# Patient Record
Sex: FEMALE | Race: AMERICAN INDIAN OR ALASKA NATIVE | HISPANIC OR LATINO
[De-identification: names, ages, dates, MRNs, and addresses within clinical notes are randomized per-mention and may not be internally consistent; named-entity substitution may affect disease eponyms.]

---

## 2017-01-13 ENCOUNTER — APPOINTMENT (OUTPATIENT)
Dept: SURGERY | Facility: CLINIC | Age: 37
End: 2017-01-13

## 2017-01-13 ENCOUNTER — OUTPATIENT (OUTPATIENT)
Dept: OUTPATIENT SERVICES | Facility: HOSPITAL | Age: 37
LOS: 1 days | End: 2017-01-13

## 2017-01-13 DIAGNOSIS — Z01.818 ENCOUNTER FOR OTHER PREPROCEDURAL EXAMINATION: ICD-10-CM

## 2017-01-13 DIAGNOSIS — E66.9 OBESITY, UNSPECIFIED: ICD-10-CM

## 2017-01-13 LAB
ALBUMIN SERPL ELPH-MCNC: 3.5 G/DL — SIGNIFICANT CHANGE UP (ref 3.4–5)
ALP SERPL-CCNC: 53 U/L — SIGNIFICANT CHANGE UP (ref 40–120)
ALT FLD-CCNC: 28 U/L — SIGNIFICANT CHANGE UP (ref 12–42)
ANION GAP SERPL CALC-SCNC: 8 MMOL/L — LOW (ref 9–16)
AST SERPL-CCNC: 18 U/L — SIGNIFICANT CHANGE UP (ref 15–37)
BILIRUB SERPL-MCNC: 0.7 MG/DL — SIGNIFICANT CHANGE UP (ref 0.2–1.2)
BUN SERPL-MCNC: 13 MG/DL — SIGNIFICANT CHANGE UP (ref 7–23)
CALCIUM SERPL-MCNC: 8.8 MG/DL — SIGNIFICANT CHANGE UP (ref 8.5–10.5)
CHLORIDE SERPL-SCNC: 108 MMOL/L — SIGNIFICANT CHANGE UP (ref 96–108)
CO2 SERPL-SCNC: 25 MMOL/L — SIGNIFICANT CHANGE UP (ref 22–31)
CREAT SERPL-MCNC: 0.6 MG/DL — SIGNIFICANT CHANGE UP (ref 0.5–1.3)
GLUCOSE SERPL-MCNC: 75 MG/DL — SIGNIFICANT CHANGE UP (ref 70–99)
HCT VFR BLD CALC: 34.6 % — SIGNIFICANT CHANGE UP (ref 34.5–45)
HGB BLD-MCNC: 11.1 G/DL — LOW (ref 11.5–15.5)
MCHC RBC-ENTMCNC: 28.5 PG — SIGNIFICANT CHANGE UP (ref 27–34)
MCHC RBC-ENTMCNC: 32.1 G/DL — SIGNIFICANT CHANGE UP (ref 32–36)
MCV RBC AUTO: 88.7 FL — SIGNIFICANT CHANGE UP (ref 80–100)
PLATELET # BLD AUTO: 255 K/UL — SIGNIFICANT CHANGE UP (ref 150–400)
POTASSIUM SERPL-MCNC: 4.1 MMOL/L — SIGNIFICANT CHANGE UP (ref 3.5–5.3)
POTASSIUM SERPL-SCNC: 4.1 MMOL/L — SIGNIFICANT CHANGE UP (ref 3.5–5.3)
PROT SERPL-MCNC: 7.4 G/DL — SIGNIFICANT CHANGE UP (ref 6.4–8.2)
RBC # BLD: 3.9 M/UL — SIGNIFICANT CHANGE UP (ref 3.8–5.2)
RBC # FLD: 15.1 % — SIGNIFICANT CHANGE UP (ref 10.3–16.9)
SODIUM SERPL-SCNC: 141 MMOL/L — SIGNIFICANT CHANGE UP (ref 135–145)
WBC # BLD: 6.5 K/UL — SIGNIFICANT CHANGE UP (ref 3.8–10.5)
WBC # FLD AUTO: 6.5 K/UL — SIGNIFICANT CHANGE UP (ref 3.8–10.5)

## 2017-01-25 ENCOUNTER — RESULT REVIEW (OUTPATIENT)
Age: 37
End: 2017-01-25

## 2017-01-26 ENCOUNTER — OUTPATIENT (OUTPATIENT)
Dept: OUTPATIENT SERVICES | Facility: HOSPITAL | Age: 37
LOS: 1 days | Discharge: HOME | End: 2017-01-26

## 2017-02-01 DIAGNOSIS — Z82.49 FAMILY HISTORY OF ISCHEMIC HEART DISEASE AND OTHER DISEASES OF THE CIRCULATORY SYSTEM: ICD-10-CM

## 2017-02-01 DIAGNOSIS — Z91.013 ALLERGY TO SEAFOOD: ICD-10-CM

## 2017-02-01 DIAGNOSIS — G43.909 MIGRAINE, UNSPECIFIED, NOT INTRACTABLE, WITHOUT STATUS MIGRAINOSUS: ICD-10-CM

## 2017-02-01 DIAGNOSIS — E66.9 OBESITY, UNSPECIFIED: ICD-10-CM

## 2017-02-01 DIAGNOSIS — Z83.3 FAMILY HISTORY OF DIABETES MELLITUS: ICD-10-CM

## 2017-02-01 DIAGNOSIS — E65 LOCALIZED ADIPOSITY: ICD-10-CM

## 2017-02-01 DIAGNOSIS — R63.4 ABNORMAL WEIGHT LOSS: ICD-10-CM

## 2017-02-01 DIAGNOSIS — Z98.84 BARIATRIC SURGERY STATUS: ICD-10-CM

## 2017-02-06 LAB — SURGICAL PATHOLOGY STUDY: SIGNIFICANT CHANGE UP

## 2019-01-04 ENCOUNTER — APPOINTMENT (OUTPATIENT)
Dept: SURGERY | Facility: CLINIC | Age: 39
End: 2019-01-04
Payer: MEDICAID

## 2019-01-04 ENCOUNTER — OTHER (OUTPATIENT)
Age: 39
End: 2019-01-04

## 2019-01-04 VITALS
OXYGEN SATURATION: 99 % | HEIGHT: 65.75 IN | HEART RATE: 97 BPM | TEMPERATURE: 97.9 F | DIASTOLIC BLOOD PRESSURE: 85 MMHG | WEIGHT: 210.38 LBS | BODY MASS INDEX: 34.22 KG/M2 | SYSTOLIC BLOOD PRESSURE: 129 MMHG

## 2019-01-04 DIAGNOSIS — G43.909 MIGRAINE, UNSPECIFIED, NOT INTRACTABLE, W/OUT STATUS MIGRAINOSUS: ICD-10-CM

## 2019-01-04 PROCEDURE — 99204 OFFICE O/P NEW MOD 45 MIN: CPT

## 2019-01-07 ENCOUNTER — OUTPATIENT (OUTPATIENT)
Dept: OUTPATIENT SERVICES | Facility: HOSPITAL | Age: 39
LOS: 1 days | End: 2019-01-07
Payer: COMMERCIAL

## 2019-01-07 ENCOUNTER — APPOINTMENT (OUTPATIENT)
Dept: SLEEP CENTER | Facility: HOSPITAL | Age: 39
End: 2019-01-07

## 2019-01-07 DIAGNOSIS — G47.33 OBSTRUCTIVE SLEEP APNEA (ADULT) (PEDIATRIC): ICD-10-CM

## 2019-01-07 PROCEDURE — 95810 POLYSOM 6/> YRS 4/> PARAM: CPT

## 2019-01-07 PROCEDURE — 95810 POLYSOM 6/> YRS 4/> PARAM: CPT | Mod: 26

## 2019-02-12 ENCOUNTER — APPOINTMENT (OUTPATIENT)
Dept: RADIOLOGY | Facility: HOSPITAL | Age: 39
End: 2019-02-12
Payer: MEDICAID

## 2019-02-12 ENCOUNTER — OUTPATIENT (OUTPATIENT)
Dept: OUTPATIENT SERVICES | Facility: HOSPITAL | Age: 39
LOS: 1 days | End: 2019-02-12
Payer: MEDICAID

## 2019-02-12 ENCOUNTER — APPOINTMENT (OUTPATIENT)
Dept: ULTRASOUND IMAGING | Facility: HOSPITAL | Age: 39
End: 2019-02-12
Payer: MEDICAID

## 2019-02-12 PROCEDURE — 74241: CPT | Mod: 26

## 2019-02-12 PROCEDURE — 76700 US EXAM ABDOM COMPLETE: CPT | Mod: 26

## 2019-02-12 PROCEDURE — 76700 US EXAM ABDOM COMPLETE: CPT

## 2019-02-12 PROCEDURE — 74241: CPT

## 2019-02-19 ENCOUNTER — APPOINTMENT (OUTPATIENT)
Age: 39
End: 2019-02-19

## 2019-02-19 VITALS — WEIGHT: 209 LBS | BODY MASS INDEX: 33.99 KG/M2 | HEIGHT: 65.75 IN

## 2019-03-06 ENCOUNTER — FORM ENCOUNTER (OUTPATIENT)
Age: 39
End: 2019-03-06

## 2019-03-07 ENCOUNTER — APPOINTMENT (OUTPATIENT)
Dept: PULMONOLOGY | Facility: CLINIC | Age: 39
End: 2019-03-07
Payer: MEDICAID

## 2019-03-07 ENCOUNTER — OUTPATIENT (OUTPATIENT)
Dept: OUTPATIENT SERVICES | Facility: HOSPITAL | Age: 39
LOS: 1 days | End: 2019-03-07
Payer: COMMERCIAL

## 2019-03-07 VITALS
HEART RATE: 89 BPM | WEIGHT: 211 LBS | BODY MASS INDEX: 34.32 KG/M2 | DIASTOLIC BLOOD PRESSURE: 80 MMHG | HEIGHT: 65.75 IN | TEMPERATURE: 98 F | SYSTOLIC BLOOD PRESSURE: 125 MMHG | OXYGEN SATURATION: 98 %

## 2019-03-07 DIAGNOSIS — Z01.811 ENCOUNTER FOR PREPROCEDURAL RESPIRATORY EXAMINATION: ICD-10-CM

## 2019-03-07 DIAGNOSIS — Z86.711 PERSONAL HISTORY OF PULMONARY EMBOLISM: ICD-10-CM

## 2019-03-07 DIAGNOSIS — Z82.49 FAMILY HISTORY OF ISCHEMIC HEART DISEASE AND OTHER DISEASES OF THE CIRCULATORY SYSTEM: ICD-10-CM

## 2019-03-07 PROCEDURE — 71046 X-RAY EXAM CHEST 2 VIEWS: CPT | Mod: 26

## 2019-03-07 PROCEDURE — 99204 OFFICE O/P NEW MOD 45 MIN: CPT

## 2019-03-07 PROCEDURE — 71046 X-RAY EXAM CHEST 2 VIEWS: CPT

## 2019-03-07 RX ORDER — ESOMEPRAZOLE MAGNESIUM 40 MG/1
40 CAPSULE, DELAYED RELEASE ORAL DAILY
Refills: 0 | Status: ACTIVE | COMMUNITY
Start: 2019-03-07

## 2019-03-07 NOTE — HISTORY OF PRESENT ILLNESS
[Difficulty Breathing During Exertion] : denies dyspnea on exertion [Feelings Of Weakness On Exertion] : denies exercise intolerance [Cough] : denies coughing [Wheezing] : denies wheezing [Regional Soft Tissue Swelling Both Lower Extremities] : denies lower extremity edema [Chest Pain Or Discomfort] : denies chest pain [Fever] : denies fever [0  -  Nothing at all] : 0, nothing at all [Class I - No Symptoms and No Limitations] : I [Never] : was never a smoker [None] : None [Adherent] : the patient is adherent with ~his/her~ medication regimen [Wt Gain ___ Lbs] : no recent weight gain [Wt Loss ___ Lbs] : no recent weight loss [Oxygen] : the patient uses no supplemental oxygen [Good Control] : peak flow has been poor [More Frequent Use Needed Recently] : Patient reports no recent increase in frequency of [Side Effects] : ~He/She~ denies medication side effects [FreeTextEntry1] : She okay. She is no shortness of breath. She had gastric bypass before but she is gaining weight. She had a sleep study.

## 2019-03-18 ENCOUNTER — APPOINTMENT (OUTPATIENT)
Age: 39
End: 2019-03-18
Payer: MEDICAID

## 2019-03-18 VITALS — BODY MASS INDEX: 34.81 KG/M2 | WEIGHT: 214 LBS | HEIGHT: 65.75 IN

## 2019-03-18 PROCEDURE — 97802 MEDICAL NUTRITION INDIV IN: CPT

## 2019-04-18 ENCOUNTER — LABORATORY RESULT (OUTPATIENT)
Age: 39
End: 2019-04-18

## 2019-04-18 VITALS — HEIGHT: 65.75 IN | BODY MASS INDEX: 34.81 KG/M2 | WEIGHT: 214 LBS

## 2019-05-13 ENCOUNTER — APPOINTMENT (OUTPATIENT)
Age: 39
End: 2019-05-13
Payer: MEDICAID

## 2019-05-13 VITALS — BODY MASS INDEX: 33.21 KG/M2 | HEIGHT: 67.5 IN | WEIGHT: 214.06 LBS

## 2019-05-13 PROCEDURE — 97803 MED NUTRITION INDIV SUBSEQ: CPT

## 2019-06-07 ENCOUNTER — APPOINTMENT (OUTPATIENT)
Dept: SURGERY | Facility: CLINIC | Age: 39
End: 2019-06-07
Payer: MEDICAID

## 2019-06-07 VITALS
TEMPERATURE: 98.06 F | WEIGHT: 213.38 LBS | BODY MASS INDEX: 33.1 KG/M2 | DIASTOLIC BLOOD PRESSURE: 78 MMHG | HEIGHT: 67.5 IN | HEART RATE: 86 BPM | SYSTOLIC BLOOD PRESSURE: 133 MMHG

## 2019-06-07 PROCEDURE — 99213 OFFICE O/P EST LOW 20 MIN: CPT

## 2019-06-07 NOTE — HISTORY OF PRESENT ILLNESS
[de-identified] : Pt is a 37 y/o F with pmhx of VSG in 2012 with  at [facility], presents today for her final visit for conversion of sleeve to bypass surgery. Pt has completed all of her weigh ins and workup for surgery. Pt reports daily severe reflux, taking omeprazole daily. Reports daily heartburn, coughing, hoarseness, hemoptysis, occasional vomiting in the morning and night from reflux. Pt to undergo bravo ph study to evaluate reflux. Pt has been sleeping with her head elevated with two pillows with some relief. Reports having this problem for 2 years, underwent endoscopy one year ago in the [place], unsure of Dr's name, will provide results. Reports difficulty swallowing liquids and solids for the past 2 years, will undergo manometry preop. \par \par Pt underwent tummy tuck in 2017 and developed PE, was on xarelto for 6 months. Pt no longer on anticoagulation. Pt to receive heme clearance prior to surgery.

## 2019-06-07 NOTE — PLAN
[FreeTextEntry1] : Pt to be scheduled for bravo ph\par Pt to receive hematology clearance due to recent hx of PE following surgery

## 2019-06-26 VITALS — HEIGHT: 67.5 IN | BODY MASS INDEX: 33.2 KG/M2 | WEIGHT: 214 LBS

## 2019-07-12 ENCOUNTER — OUTPATIENT (OUTPATIENT)
Dept: OUTPATIENT SERVICES | Facility: HOSPITAL | Age: 39
LOS: 1 days | Discharge: HOME | End: 2019-07-12
Payer: MEDICAID

## 2019-07-12 PROCEDURE — 43239 EGD BIOPSY SINGLE/MULTIPLE: CPT

## 2019-07-12 PROCEDURE — 91035 G-ESOPH REFLX TST W/ELECTROD: CPT | Mod: 26

## 2019-07-12 PROCEDURE — 91010 ESOPHAGUS MOTILITY STUDY: CPT | Mod: 26

## 2019-07-12 PROCEDURE — 91013 ESOPHGL MOTIL W/STIM/PERFUS: CPT | Mod: 26

## 2019-07-12 PROCEDURE — C1889: CPT

## 2019-07-12 PROCEDURE — 43235 EGD DIAGNOSTIC BRUSH WASH: CPT

## 2019-07-30 ENCOUNTER — APPOINTMENT (OUTPATIENT)
Dept: SURGERY | Facility: CLINIC | Age: 39
End: 2019-07-30
Payer: MEDICAID

## 2019-07-30 VITALS
DIASTOLIC BLOOD PRESSURE: 81 MMHG | HEIGHT: 67.5 IN | BODY MASS INDEX: 33.06 KG/M2 | OXYGEN SATURATION: 100 % | TEMPERATURE: 208.22 F | SYSTOLIC BLOOD PRESSURE: 132 MMHG | HEART RATE: 83 BPM | WEIGHT: 213.13 LBS

## 2019-07-30 PROCEDURE — 99213 OFFICE O/P EST LOW 20 MIN: CPT

## 2019-07-31 NOTE — PLAN
[FreeTextEntry1] : Pt to be scheduled for conversion to gastric bypass\par \par Patient completed all bariatric work-up including 6 month weigh-ins. \par \par *Medical necessity case*

## 2019-07-31 NOTE — HISTORY OF PRESENT ILLNESS
[de-identified] : Pt is a 37 y/o F with pmhx of VSG in 2012 with  at [facility], presents today feeling well following up with results of bravo/mano/egd, showing Grade C esophagitis. Pt states she is experiencing daily severe reflux symptoms, w/o any relief on daily omeprazole. States that with every meal, she has symptoms. Discussed with pt that surgical conversion to gastric bypass is curative for her reflux, and pt understands this and wants to go forward with conversion to bypass.

## 2019-08-05 DIAGNOSIS — K20.9 ESOPHAGITIS, UNSPECIFIED: ICD-10-CM

## 2019-09-17 ENCOUNTER — APPOINTMENT (OUTPATIENT)
Dept: SURGERY | Facility: CLINIC | Age: 39
End: 2019-09-17
Payer: MEDICAID

## 2019-09-17 VITALS
DIASTOLIC BLOOD PRESSURE: 82 MMHG | WEIGHT: 216.31 LBS | HEART RATE: 87 BPM | OXYGEN SATURATION: 99 % | SYSTOLIC BLOOD PRESSURE: 132 MMHG | TEMPERATURE: 208.76 F | HEIGHT: 67.5 IN | BODY MASS INDEX: 33.56 KG/M2

## 2019-09-17 DIAGNOSIS — Z98.84 BARIATRIC SURGERY STATUS: ICD-10-CM

## 2019-09-17 PROCEDURE — 99213 OFFICE O/P EST LOW 20 MIN: CPT

## 2019-09-17 NOTE — HISTORY OF PRESENT ILLNESS
[de-identified] : 39 y/o F with PSHx of VSG in 2012 with  at [facility], presents here today with questions about her clearance for surgery. She states that she has not received a letter of support from her PCP,  and was informed to obtain a cardiological and hematological clearance prior given her hx of PE in 2017 s/p Tummy tuck. She has been evaluated by cardiology and found to have an abnormal stress test, prompting further cardiological evaluation before clearance. She will be seeing the cardiologist at the [facility]. She states that she has had an appointment next Thursday with her Cardiologist and Hematologist. Pt will be scheduled after she has clearances.

## 2019-09-17 NOTE — END OF VISIT
[FreeTextEntry3] : All medical record entries made by the Scribe were at my, Dr. Wilson's direction and personally dictated by me on 09/17/2019  I have reviewed the chart and agree that the record accurately reflects my personal performance of the history, physical exam, assessment and plan. I have also personally directed, reviewed, and agreed with the chart.\par \par

## 2019-09-17 NOTE — ADDENDUM
[FreeTextEntry1] : Documented by Slime Still acting as a scribe for Dr. Dewey Wilson on 09/17/2019\par

## 2019-09-19 ENCOUNTER — APPOINTMENT (OUTPATIENT)
Dept: BARIATRICS | Facility: CLINIC | Age: 39
End: 2019-09-19

## 2019-10-17 ENCOUNTER — APPOINTMENT (OUTPATIENT)
Dept: BARIATRICS | Facility: CLINIC | Age: 39
End: 2019-10-17

## 2019-10-25 ENCOUNTER — APPOINTMENT (OUTPATIENT)
Dept: SURGERY | Facility: CLINIC | Age: 39
End: 2019-10-25
Payer: MEDICAID

## 2019-10-25 VITALS
DIASTOLIC BLOOD PRESSURE: 79 MMHG | WEIGHT: 212.25 LBS | BODY MASS INDEX: 32.92 KG/M2 | HEART RATE: 90 BPM | OXYGEN SATURATION: 100 % | TEMPERATURE: 208.04 F | HEIGHT: 67.5 IN | SYSTOLIC BLOOD PRESSURE: 124 MMHG

## 2019-10-25 PROCEDURE — 99213 OFFICE O/P EST LOW 20 MIN: CPT

## 2019-10-25 NOTE — HISTORY OF PRESENT ILLNESS
[de-identified] : 37 y/o F with PSHx of VSG in 2012 with  at [facility], presents here today for a final visit. She states that she has completed all of her work up. She states that she has also obtained the necessary clearances (Pulmonary, cardiac, medical and hematology). She is ready to be scheduled for surgery. Patient also continues to complain of daily acid reflux as well as heartburn.

## 2019-10-25 NOTE — ASSESSMENT
[FreeTextEntry1] : 39 y/o F with PSHx of VSG in 2012 with  at [facility] c/b GERD. Patient has completed work up and obtained clearances. Patient will send the letters here. Patient continues to suffer from GERD and will need a conversion of her sleeve to a gastric bypass as a medical necessity. Risks, benefits, and alternatives to the proposed procedure were discussed with the patient and all questions were answered to their satisfaction. Pre and Post-operative instructions were provided to the patient. Patient understands and agrees to undergo the proposed procedure. Will schedule patient for now.

## 2019-10-25 NOTE — ADDENDUM
[FreeTextEntry1] : Documented by Slime Still acting as a scribe for Dr. Dewey Wilson on 10/25/2019\par

## 2019-10-25 NOTE — END OF VISIT
[FreeTextEntry3] : All medical record entries made by the Scribe were at my, Dr. Wilson's direction and personally dictated by me on 10/25/2019  I have reviewed the chart and agree that the record accurately reflects my personal performance of the history, physical exam, assessment and plan. I have also personally directed, reviewed, and agreed with the chart.\par \par

## 2019-12-12 ENCOUNTER — TRANSCRIPTION ENCOUNTER (OUTPATIENT)
Age: 39
End: 2019-12-12

## 2019-12-12 ENCOUNTER — INPATIENT (INPATIENT)
Facility: HOSPITAL | Age: 39
LOS: 2 days | Discharge: HOME | DRG: 621 | End: 2019-12-15
Attending: SURGERY | Admitting: SURGERY
Payer: MEDICAID

## 2019-12-12 ENCOUNTER — APPOINTMENT (OUTPATIENT)
Dept: SURGERY | Facility: HOSPITAL | Age: 39
End: 2019-12-12

## 2019-12-12 ENCOUNTER — RESULT REVIEW (OUTPATIENT)
Age: 39
End: 2019-12-12

## 2019-12-12 VITALS
WEIGHT: 209.22 LBS | SYSTOLIC BLOOD PRESSURE: 142 MMHG | DIASTOLIC BLOOD PRESSURE: 83 MMHG | TEMPERATURE: 98 F | OXYGEN SATURATION: 100 % | RESPIRATION RATE: 18 BRPM | HEART RATE: 89 BPM | HEIGHT: 66 IN

## 2019-12-12 DIAGNOSIS — Z98.84 BARIATRIC SURGERY STATUS: Chronic | ICD-10-CM

## 2019-12-12 DIAGNOSIS — Z98.890 OTHER SPECIFIED POSTPROCEDURAL STATES: Chronic | ICD-10-CM

## 2019-12-12 DIAGNOSIS — Z41.9 ENCOUNTER FOR PROCEDURE FOR PURPOSES OTHER THAN REMEDYING HEALTH STATE, UNSPECIFIED: Chronic | ICD-10-CM

## 2019-12-12 DIAGNOSIS — Z90.49 ACQUIRED ABSENCE OF OTHER SPECIFIED PARTS OF DIGESTIVE TRACT: Chronic | ICD-10-CM

## 2019-12-12 LAB
HCT VFR BLD CALC: 38.4 % — SIGNIFICANT CHANGE UP (ref 34.5–45)
HGB BLD-MCNC: 11.5 G/DL — SIGNIFICANT CHANGE UP (ref 11.5–15.5)
MCHC RBC-ENTMCNC: 26.9 PG — LOW (ref 27–34)
MCHC RBC-ENTMCNC: 29.9 GM/DL — LOW (ref 32–36)
MCV RBC AUTO: 89.9 FL — SIGNIFICANT CHANGE UP (ref 80–100)
NRBC # BLD: 0 /100 WBCS — SIGNIFICANT CHANGE UP (ref 0–0)
PLATELET # BLD AUTO: 250 K/UL — SIGNIFICANT CHANGE UP (ref 150–400)
RBC # BLD: 4.27 M/UL — SIGNIFICANT CHANGE UP (ref 3.8–5.2)
RBC # FLD: 15.8 % — HIGH (ref 10.3–14.5)
WBC # BLD: 9.69 K/UL — SIGNIFICANT CHANGE UP (ref 3.8–10.5)
WBC # FLD AUTO: 9.69 K/UL — SIGNIFICANT CHANGE UP (ref 3.8–10.5)

## 2019-12-12 PROCEDURE — 43645 LAP GASTR BYPASS INCL SMLL I: CPT

## 2019-12-12 PROCEDURE — 88307 TISSUE EXAM BY PATHOLOGIST: CPT | Mod: 26

## 2019-12-12 PROCEDURE — S2900 ROBOTIC SURGICAL SYSTEM: CPT | Mod: NC

## 2019-12-12 RX ORDER — ENOXAPARIN SODIUM 100 MG/ML
40 INJECTION SUBCUTANEOUS EVERY 12 HOURS
Refills: 0 | Status: DISCONTINUED | OUTPATIENT
Start: 2019-12-12 | End: 2019-12-15

## 2019-12-12 RX ORDER — SUMATRIPTAN SUCCINATE 4 MG/.5ML
0 INJECTION, SOLUTION SUBCUTANEOUS
Qty: 0 | Refills: 0 | DISCHARGE

## 2019-12-12 RX ORDER — PANTOPRAZOLE SODIUM 20 MG/1
40 TABLET, DELAYED RELEASE ORAL DAILY
Refills: 0 | Status: DISCONTINUED | OUTPATIENT
Start: 2019-12-12 | End: 2019-12-15

## 2019-12-12 RX ORDER — ACETAMINOPHEN 500 MG
1000 TABLET ORAL ONCE
Refills: 0 | Status: COMPLETED | OUTPATIENT
Start: 2019-12-12 | End: 2019-12-12

## 2019-12-12 RX ORDER — SCOPALAMINE 1 MG/3D
1 PATCH, EXTENDED RELEASE TRANSDERMAL ONCE
Refills: 0 | Status: COMPLETED | OUTPATIENT
Start: 2019-12-12 | End: 2019-12-12

## 2019-12-12 RX ORDER — ACETAMINOPHEN 500 MG
1000 TABLET ORAL ONCE
Refills: 0 | Status: COMPLETED | OUTPATIENT
Start: 2019-12-13 | End: 2019-12-13

## 2019-12-12 RX ORDER — HYDROMORPHONE HYDROCHLORIDE 2 MG/ML
0.5 INJECTION INTRAMUSCULAR; INTRAVENOUS; SUBCUTANEOUS EVERY 4 HOURS
Refills: 0 | Status: DISCONTINUED | OUTPATIENT
Start: 2019-12-12 | End: 2019-12-15

## 2019-12-12 RX ORDER — ENOXAPARIN SODIUM 100 MG/ML
40 INJECTION SUBCUTANEOUS ONCE
Refills: 0 | Status: COMPLETED | OUTPATIENT
Start: 2019-12-12 | End: 2019-12-12

## 2019-12-12 RX ORDER — SODIUM CHLORIDE 9 MG/ML
1000 INJECTION, SOLUTION INTRAVENOUS
Refills: 0 | Status: DISCONTINUED | OUTPATIENT
Start: 2019-12-12 | End: 2019-12-13

## 2019-12-12 RX ORDER — HYDROMORPHONE HYDROCHLORIDE 2 MG/ML
0.5 INJECTION INTRAMUSCULAR; INTRAVENOUS; SUBCUTANEOUS
Refills: 0 | Status: DISCONTINUED | OUTPATIENT
Start: 2019-12-12 | End: 2019-12-13

## 2019-12-12 RX ORDER — ONDANSETRON 8 MG/1
4 TABLET, FILM COATED ORAL EVERY 6 HOURS
Refills: 0 | Status: DISCONTINUED | OUTPATIENT
Start: 2019-12-12 | End: 2019-12-15

## 2019-12-12 RX ORDER — GABAPENTIN 400 MG/1
300 CAPSULE ORAL ONCE
Refills: 0 | Status: COMPLETED | OUTPATIENT
Start: 2019-12-12 | End: 2019-12-12

## 2019-12-12 RX ORDER — KETOROLAC TROMETHAMINE 30 MG/ML
30 SYRINGE (ML) INJECTION EVERY 6 HOURS
Refills: 0 | Status: DISCONTINUED | OUTPATIENT
Start: 2019-12-12 | End: 2019-12-15

## 2019-12-12 RX ORDER — MAGNESIUM SULFATE 500 MG/ML
2 VIAL (ML) INJECTION ONCE
Refills: 0 | Status: COMPLETED | OUTPATIENT
Start: 2019-12-12 | End: 2019-12-12

## 2019-12-12 RX ADMIN — SCOPALAMINE 1 PATCH: 1 PATCH, EXTENDED RELEASE TRANSDERMAL at 06:50

## 2019-12-12 RX ADMIN — HYDROMORPHONE HYDROCHLORIDE 0.5 MILLIGRAM(S): 2 INJECTION INTRAMUSCULAR; INTRAVENOUS; SUBCUTANEOUS at 22:00

## 2019-12-12 RX ADMIN — Medication 400 MILLIGRAM(S): at 18:16

## 2019-12-12 RX ADMIN — SCOPALAMINE 1 PATCH: 1 PATCH, EXTENDED RELEASE TRANSDERMAL at 19:40

## 2019-12-12 RX ADMIN — ENOXAPARIN SODIUM 40 MILLIGRAM(S): 100 INJECTION SUBCUTANEOUS at 06:50

## 2019-12-12 RX ADMIN — HYDROMORPHONE HYDROCHLORIDE 0.5 MILLIGRAM(S): 2 INJECTION INTRAMUSCULAR; INTRAVENOUS; SUBCUTANEOUS at 21:27

## 2019-12-12 RX ADMIN — HYDROMORPHONE HYDROCHLORIDE 0.5 MILLIGRAM(S): 2 INJECTION INTRAMUSCULAR; INTRAVENOUS; SUBCUTANEOUS at 14:09

## 2019-12-12 RX ADMIN — HYDROMORPHONE HYDROCHLORIDE 0.5 MILLIGRAM(S): 2 INJECTION INTRAMUSCULAR; INTRAVENOUS; SUBCUTANEOUS at 13:50

## 2019-12-12 RX ADMIN — ONDANSETRON 4 MILLIGRAM(S): 8 TABLET, FILM COATED ORAL at 21:27

## 2019-12-12 RX ADMIN — ENOXAPARIN SODIUM 40 MILLIGRAM(S): 100 INJECTION SUBCUTANEOUS at 18:16

## 2019-12-12 RX ADMIN — Medication 30 MILLIGRAM(S): at 19:10

## 2019-12-12 RX ADMIN — Medication 50 GRAM(S): at 09:30

## 2019-12-12 RX ADMIN — HYDROMORPHONE HYDROCHLORIDE 0.5 MILLIGRAM(S): 2 INJECTION INTRAMUSCULAR; INTRAVENOUS; SUBCUTANEOUS at 13:14

## 2019-12-12 RX ADMIN — Medication 30 MILLIGRAM(S): at 18:16

## 2019-12-12 RX ADMIN — Medication 1000 MILLIGRAM(S): at 06:50

## 2019-12-12 RX ADMIN — Medication 1000 MILLIGRAM(S): at 19:10

## 2019-12-12 RX ADMIN — GABAPENTIN 300 MILLIGRAM(S): 400 CAPSULE ORAL at 06:50

## 2019-12-12 RX ADMIN — HYDROMORPHONE HYDROCHLORIDE 0.5 MILLIGRAM(S): 2 INJECTION INTRAMUSCULAR; INTRAVENOUS; SUBCUTANEOUS at 15:21

## 2019-12-12 NOTE — H&P ADULT - NSHPPHYSICALEXAM_GEN_ALL_CORE
Gen: AAOx3, NAD  Pulm: No respiratory distress, normal effort  Abd: Obese, soft, NT/ND, well healed surgical scars  Ext: No edema, WWP

## 2019-12-12 NOTE — DISCHARGE NOTE PROVIDER - NSDCFUADDINST_GEN_ALL_CORE_FT
1) Please take Percocet 1 to 2 tabs by mouth every 4 to 6 hours as needed for severe pain control.  2) Please take Tylenol 650 mg every 4 to 6 hours by mouth for moderate pain control.   3) Please take Protonix 40 mg once a day by mouth.  4) Please take Lovenox 40 mg subcutaneous injection twice a day to prevent DVT and PE.  Follow up with  in 1 week. Call the office at  to schedule your appointment. You may shower; soap and water over incision sites. Do not scrub. Pat dry when done. No tub bathing or swimming until cleared. Keep incision sites out of the sun as scars will darken. No heavy lifting (>10lbs) or strenuous exercise. Diet: Bariatric Full Fluids. 60 grams protein daily.  64 fluid ounces water daily. Drink small sips throughout the day. Continue diet as outlined by paperwork received as a pre-operative patient. You should be urinating at least 3-4x per day. Call the office if you experience increasing abdominal pain, nausea, vomiting, or temperature >100.4F.  NO ASPIRIN OR NSAIDs until approved by Dr. Wilson. Avoid alcoholic beverages until cleared by Dr. Wilson. 1) Please take Percocet 1 to 2 tabs by mouth every 4 to 6 hours as needed for severe pain control.  2) Please take Tylenol 650 mg every 4 to 6 hours by mouth for moderate pain control. Do not take tylenol and percocet at the same time because perocet has tylenol in it. (Instead choose either tylenol OR percocet at any given 4-6 hour time interval, depending on your level of pain).  3) Please take Protonix 40 mg once a day by mouth.  4) Please take Lovenox 40 mg subcutaneous injection twice a day to prevent DVT and PE.  Follow up with Dr. Wilson in 1 week. Call the office at  to schedule your appointment. You may shower; soap and water over incision sites. Do not scrub. Pat dry when done. No tub bathing or swimming until cleared. Keep incision sites out of the sun as scars will darken. No heavy lifting (>10lbs) or strenuous exercise. Diet: Bariatric Full Fluids. 60 grams protein daily.  64 fluid ounces water daily. Drink small sips throughout the day. Continue diet as outlined by paperwork received as a pre-operative patient. You should be urinating at least 3-4x per day. Call the office if you experience increasing abdominal pain, nausea, vomiting, or temperature >100.4F.  NO ASPIRIN OR NSAIDs until approved by Dr. Wilson. Avoid alcoholic beverages until cleared by Dr. Wilson.

## 2019-12-12 NOTE — BRIEF OPERATIVE NOTE - NSICDXBRIEFPROCEDURE_GEN_ALL_CORE_FT
PROCEDURES:  Robot-assisted hiatal herniorrhaphy using da Tono Xi 12-Dec-2019 12:50:43  Jeff Elizondo  Robot-assisted revision of gastric bypass using da Tono Xi 12-Dec-2019 12:50:30  Jeff Elizondo

## 2019-12-12 NOTE — H&P ADULT - NSICDXPASTSURGICALHX_GEN_ALL_CORE_FT
PAST SURGICAL HISTORY:  Elective surgery right breast fibroid removal    H/O bariatric surgery     History of abdominoplasty     History of cholecystectomy

## 2019-12-12 NOTE — PROGRESS NOTE ADULT - SUBJECTIVE AND OBJECTIVE BOX
POST-OPERATIVE NOTE    Procedure: robotic assisted conversion of sleeve to  gastric bypass     Diagnosis/Indication: GERD     Surgeon: Dr. Wilson     S: Pt has no complaints. Denies CP, SOB, YOUSIF, calf tenderness. Pain controlled with medication.    O:  T(C): 36.2 (12-12-19 @ 12:24), Max: 36.2 (12-12-19 @ 12:24)  T(F): 97.2 (12-12-19 @ 12:24), Max: 97.2 (12-12-19 @ 12:24)  HR: 80 (12-12-19 @ 13:54) (71 - 81)  BP: 149/83 (12-12-19 @ 13:54) (140/84 - 161/85)  RR: 16 (12-12-19 @ 13:54) (15 - 18)  SpO2: 100% (12-12-19 @ 13:54) (100% - 100%)  Wt(kg): --            Gen: NAD, resting comfortably in bed  C/V: NSR  Pulm: Nonlabored breathing, no respiratory distress  Abd: Soft, non-distended, TTP around incision site, incision clean dry and intact  Extrem: WWP, no calf edema, SCDs in place

## 2019-12-12 NOTE — DISCHARGE NOTE PROVIDER - NSDCCPCAREPLAN_GEN_ALL_CORE_FT
PRINCIPAL DISCHARGE DIAGNOSIS  Diagnosis: GERD (gastroesophageal reflux disease)  Assessment and Plan of Treatment: 39 year old female with history of sleeve gastrectomy in 2013 who has since developed GERD and dysphagia and now presents electively for conversion of the sleeve to a gastric bypass. She had an abdominoplasty in 2017 that was complicated by a PE two weeks post-op, and she was on Xarelto for 6 months.12/12: Robotic assisted conversion of sleeve to gastric bypass .Pt tolerated the procedure well.   1) Please take Percocet 1 to 2 tabs by mouth every 4 to 6 hours as needed for severe pain control.  2) Please take Tylenol 650 mg every 4 to 6 hours by mouth for moderate pain control.   3) Please take Protonix 40 mg once a day by mouth.  4) Please take Lovenox 40 mg subcutaneous injection twice a day to prevent DVT and PE.

## 2019-12-12 NOTE — H&P ADULT - ASSESSMENT
39F with GERD that developed after a sleeve gastrectomy and now presents electively for robotic-assisted conversion to gastric bypass.  - Admit to surgery, telemetry bed  - OR for above procedure  - Needs monitoring post-op due to PE history  - Incentive spirometry, frequent ambulation, Lovenox 40mg SC BID, SCDs  - Will likely discharge on Lovenox when appropriate

## 2019-12-12 NOTE — DISCHARGE NOTE PROVIDER - CARE PROVIDER_API CALL
Mary Wilson (DO)  [facility]  [address] [facility], [address] [place] [address]  Phone: [phone]  Fax: [phone]  Follow Up Time: 1 week Dewey Wilson)  Surgery  [address] [place] [address]  Phone: [phone]  Fax: [phone]  Follow Up Time:

## 2019-12-12 NOTE — PROGRESS NOTE ADULT - ASSESSMENT
39 year old female with history of sleeve gastrectomy since developed GERD and dysphagia now presents for elective conversion of the  gastric bypass. Patient s/p robotic assisted gastric bypass .pod #0  -Pain/nausea control  -BCLD, IVF  -Protonix  -CBC 6hrs post-op   -Lovenox DVT ppx   -SCDs, OOB/A, IS  -AM labs  -Nutritional consult in AM

## 2019-12-12 NOTE — DISCHARGE NOTE PROVIDER - NSDCMRMEDTOKEN_GEN_ALL_CORE_FT
omeprazole 40 mg oral delayed release capsule: 1 cap(s) orally once a day  SUMAtriptan: SUMAtriptan:

## 2019-12-12 NOTE — H&P ADULT - HISTORY OF PRESENT ILLNESS
Patient is a 39 year old female with history of sleeve gastrectomy in 2013 who has since developed GERD and dysphagia and now presents electively for conversion of the sleeve to a gastric bypass. She had an abdominoplasty in 2017 that was complicated by a PE two weeks post-op, and she was on Xarelto for 6 months. She currently sees a hematologist, cardiologist and pulmonologist and has been optimized for this operation by all. No complaints at present.

## 2019-12-12 NOTE — BRIEF OPERATIVE NOTE - OPERATION/FINDINGS
Robotic conversion of sleeve gastrectomy to gastric bypass. Small hiatal hernia noted, repaired with permanent suture. Gastric pouch created over 32Fr bougie with robotic stapler. Gastrojejunostomy created in two layered hand sewn fashion. Jejunojejunostomy created with robotic stapler. BP limb about 100cm and pauline limb about 125cm. Both mesenteric defects sutured closed with permanent suture. Staple line at pauline limb blind pouch oversewn due to staple line oozing. ICG leak test of GJ anastomosis negative for leak.

## 2019-12-12 NOTE — H&P ADULT - NSHPRISKHIVSCREEN_GEN_ALL_CORE
Central Line Type: Port  [facility]:   Port cleansed with ChloraPrep per protocol. Right  and Chest  Accessed via: 20 gauge  Campo needle  Patency Verification: Blood return greater or equal to 3 ml before, during and after treatment  Port flushed with: 10 ml 0.9 normal saline and 100 un/ml- 500 units heparin  Campo needle removed: Yes  Deaccess/site appearance: Clear (no redness, tenderness, or edema), dressing applied if required  Discharged: Stable and Follow up appointment scheduled      Dr. Sadiq Blakely is supervising clinician today. none Offered and patient declined

## 2019-12-12 NOTE — DISCHARGE NOTE PROVIDER - CARE PROVIDERS DIRECT ADDRESSES
[email or web address]

## 2019-12-12 NOTE — DISCHARGE NOTE PROVIDER - NSDCFUADDAPPT_GEN_ALL_CORE_FT
Please call Dr. Wilson's office this week to schedule a follow-up appointment.    Please also call your primary care provider to schedule a follow up visit with them as well.

## 2019-12-12 NOTE — H&P ADULT - NSICDXPASTMEDICALHX_GEN_ALL_CORE_FT
PAST MEDICAL HISTORY:  Anemia     Breast disease benign neoplasm of left breast    GERD (gastroesophageal reflux disease)     Migraine     Morbid obesity     Pulmonary embolism     Vitamin D deficiency

## 2019-12-12 NOTE — DISCHARGE NOTE PROVIDER - HOSPITAL COURSE
39 year old female with history of sleeve gastrectomy in 2013 who has since developed GERD and dysphagia and now presents electively for conversion of the sleeve to a gastric bypass. She had an abdominoplasty in 2017 that was complicated by a PE two weeks post-op, and she was on Xarelto for 6 months.12/12: Robotic assisted conversion of sleeve to gastric bypass .Pt tolerated the procedure well. At time of discharge, pt was tolerating a bariatric clear liquid diet, and pt's pain was controlled. Plan is to follow up with Dr. Wilson in the office. 39 year old female with history of sleeve gastrectomy in 2013 who has since developed GERD and dysphagia and now presents electively for conversion of the sleeve to a gastric bypass. She had an abdominoplasty in 2017 that was complicated by a PE two weeks post-op, and she was on Xarelto for 6 months.12/12: Robotic assisted conversion of sleeve to gastric bypass .Pt tolerated the procedure well. At time of discharge, pt was tolerating a bariatric clear liquid diet, and pt's pain was controlled. On day of discharge, US BLE was obtained which showed no evidence of DVT. Plan is to follow up with Dr. Wilson in the office.

## 2019-12-12 NOTE — DISCHARGE NOTE PROVIDER - NSDCCPGOAL_GEN_ALL_CORE_FT
To get better and follow your care plan as instructed.    1) Please take Percocet 1 to 2 tabs by mouth every 4 to 6 hours as needed for severe pain control.  2) Please take Tylenol 650 mg every 4 to 6 hours by mouth for moderate pain control.   3) Please take Protonix 40 mg once a day by mouth.  4) Please take Lovenox 40 mg subcutaneous injection twice a day to prevent DVT and PE.

## 2019-12-13 LAB
ANION GAP SERPL CALC-SCNC: 11 MMOL/L — SIGNIFICANT CHANGE UP (ref 5–17)
BUN SERPL-MCNC: 6 MG/DL — LOW (ref 7–23)
CALCIUM SERPL-MCNC: 9 MG/DL — SIGNIFICANT CHANGE UP (ref 8.4–10.5)
CHLORIDE SERPL-SCNC: 104 MMOL/L — SIGNIFICANT CHANGE UP (ref 96–108)
CO2 SERPL-SCNC: 23 MMOL/L — SIGNIFICANT CHANGE UP (ref 22–31)
CREAT SERPL-MCNC: 0.72 MG/DL — SIGNIFICANT CHANGE UP (ref 0.5–1.3)
GLUCOSE SERPL-MCNC: 115 MG/DL — HIGH (ref 70–99)
HCT VFR BLD CALC: 35.2 % — SIGNIFICANT CHANGE UP (ref 34.5–45)
HGB BLD-MCNC: 10.8 G/DL — LOW (ref 11.5–15.5)
MAGNESIUM SERPL-MCNC: 2.3 MG/DL — SIGNIFICANT CHANGE UP (ref 1.6–2.6)
MCHC RBC-ENTMCNC: 27.6 PG — SIGNIFICANT CHANGE UP (ref 27–34)
MCHC RBC-ENTMCNC: 30.7 GM/DL — LOW (ref 32–36)
MCV RBC AUTO: 90 FL — SIGNIFICANT CHANGE UP (ref 80–100)
NRBC # BLD: 0 /100 WBCS — SIGNIFICANT CHANGE UP (ref 0–0)
PHOSPHATE SERPL-MCNC: 3.1 MG/DL — SIGNIFICANT CHANGE UP (ref 2.5–4.5)
PLATELET # BLD AUTO: 226 K/UL — SIGNIFICANT CHANGE UP (ref 150–400)
POTASSIUM SERPL-MCNC: 4.3 MMOL/L — SIGNIFICANT CHANGE UP (ref 3.5–5.3)
POTASSIUM SERPL-SCNC: 4.3 MMOL/L — SIGNIFICANT CHANGE UP (ref 3.5–5.3)
RBC # BLD: 3.91 M/UL — SIGNIFICANT CHANGE UP (ref 3.8–5.2)
RBC # FLD: 15.9 % — HIGH (ref 10.3–14.5)
SODIUM SERPL-SCNC: 138 MMOL/L — SIGNIFICANT CHANGE UP (ref 135–145)
WBC # BLD: 10.47 K/UL — SIGNIFICANT CHANGE UP (ref 3.8–10.5)
WBC # FLD AUTO: 10.47 K/UL — SIGNIFICANT CHANGE UP (ref 3.8–10.5)

## 2019-12-13 RX ORDER — SODIUM CHLORIDE 9 MG/ML
1000 INJECTION, SOLUTION INTRAVENOUS
Refills: 0 | Status: DISCONTINUED | OUTPATIENT
Start: 2019-12-13 | End: 2019-12-14

## 2019-12-13 RX ADMIN — Medication 400 MILLIGRAM(S): at 00:22

## 2019-12-13 RX ADMIN — ENOXAPARIN SODIUM 40 MILLIGRAM(S): 100 INJECTION SUBCUTANEOUS at 18:31

## 2019-12-13 RX ADMIN — Medication 30 MILLIGRAM(S): at 00:22

## 2019-12-13 RX ADMIN — Medication 30 MILLIGRAM(S): at 06:06

## 2019-12-13 RX ADMIN — Medication 1000 MILLIGRAM(S): at 01:00

## 2019-12-13 RX ADMIN — Medication 30 MILLIGRAM(S): at 01:00

## 2019-12-13 RX ADMIN — ENOXAPARIN SODIUM 40 MILLIGRAM(S): 100 INJECTION SUBCUTANEOUS at 06:07

## 2019-12-13 RX ADMIN — Medication 30 MILLIGRAM(S): at 06:40

## 2019-12-13 RX ADMIN — Medication 30 MILLIGRAM(S): at 11:59

## 2019-12-13 RX ADMIN — Medication 30 MILLIGRAM(S): at 18:31

## 2019-12-13 RX ADMIN — SCOPALAMINE 1 PATCH: 1 PATCH, EXTENDED RELEASE TRANSDERMAL at 18:37

## 2019-12-13 RX ADMIN — SODIUM CHLORIDE 150 MILLILITER(S): 9 INJECTION, SOLUTION INTRAVENOUS at 23:35

## 2019-12-13 RX ADMIN — Medication 1000 MILLIGRAM(S): at 06:40

## 2019-12-13 RX ADMIN — PANTOPRAZOLE SODIUM 40 MILLIGRAM(S): 20 TABLET, DELAYED RELEASE ORAL at 11:59

## 2019-12-13 RX ADMIN — Medication 30 MILLIGRAM(S): at 12:00

## 2019-12-13 RX ADMIN — SCOPALAMINE 1 PATCH: 1 PATCH, EXTENDED RELEASE TRANSDERMAL at 06:32

## 2019-12-13 RX ADMIN — Medication 30 MILLIGRAM(S): at 18:37

## 2019-12-13 RX ADMIN — Medication 400 MILLIGRAM(S): at 06:06

## 2019-12-13 NOTE — PROGRESS NOTE ADULT - ASSESSMENT
39 year old female with history of sleeve gastrectomy since developed GERD and dysphagia now presents for elective conversion of the  gastric bypass. Patient s/p robotic assisted gastric bypass 12/12    -Pain/nausea control  -BCLD/IVF  -Protonix  -Lovenox DVT ppx   -SCDs, OOB/A, IS  -AM labs  -Jaylene removed this am, f/u tov   -Nutritional consult   -F/u B/L LE duplex

## 2019-12-13 NOTE — DIETITIAN INITIAL EVALUATION ADULT. - OTHER INFO
39F with hx of sleeve gastrectomy in 2013 leading to developed GERD and dysphagia and now admitting for elective conversion of the sleeve to a gastric bypass (12/12), now POD #1. On assessment, pt resting in bed. Currently on BARICLLIQ diet, tolerating PO. Pt had adequate PO intake this morning, consuming 4oz prior to 1200pm. Pain controlled but pt with some complaints of nausea- on zofran. Discussed volumes of various cup sizes on tray table and encouraged aiming for 4-5 oz/hr as tolerated. Prepared with protein shakes, with plan to get vitamins after discharge. RD provided indepth edu on diet advancement and specific nutrient needs s/p gastric bypass. NKFA. No dietary restrictions at home. Skin: surgical incisions. GI: WDL per flowsheet. RD to follow up per protocol.
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
## 2019-12-13 NOTE — PROGRESS NOTE ADULT - SUBJECTIVE AND OBJECTIVE BOX
INTERVAL HPI/OVERNIGHT EVENTS: NAEO. AFVSS.     STATUS POST:  robot assisted conversion of sleeve to gastric bypass 12/12    POST OPERATIVE DAY #: 1    SUBJECTIVE: Pt seen and examined at bedside this am by surgery team. Pain well controlled. Passing flatus. Denies f/n/v/cp/sob.    MEDICATIONS  (STANDING):  enoxaparin Injectable 40 milliGRAM(s) SubCutaneous every 12 hours  ketorolac   Injectable 30 milliGRAM(s) IV Push every 6 hours  lactated ringers. 1000 milliLiter(s) (150 mL/Hr) IV Continuous <Continuous>  pantoprazole  Injectable 40 milliGRAM(s) IV Push daily    MEDICATIONS  (PRN):  HYDROmorphone  Injectable 0.5 milliGRAM(s) IV Push every 4 hours PRN Breakthrough pain only  HYDROmorphone  Injectable 0.5 milliGRAM(s) IV Push every 4 hours PRN Severe Pain (7 - 10)  ondansetron Injectable 4 milliGRAM(s) IV Push every 6 hours PRN Nausea      Vital Signs Last 24 Hrs  T(C): 36.9 (13 Dec 2019 04:51), Max: 37.3 (12 Dec 2019 21:06)  T(F): 98.4 (13 Dec 2019 04:51), Max: 99.2 (12 Dec 2019 21:06)  HR: 95 (13 Dec 2019 05:32) (71 - 95)  BP: 139/76 (13 Dec 2019 05:32) (130/68 - 161/85)  BP(mean): --  RR: 18 (13 Dec 2019 05:32) (14 - 18)  SpO2: 99% (13 Dec 2019 05:32) (97% - 100%)    PHYSICAL EXAM:    Constitutional: A&Ox3, NAD    Respiratory: non labored breathing, no respiratory distress    Cardiovascular: NSR, RRR    Gastrointestinal: abdomen soft, nd, nt     Genitourinary: Mariee in place draining clear yellow urine     Extremities: wwp, no calf tenderness or edema. SCDs in place       I&O's Detail    12 Dec 2019 07:01  -  13 Dec 2019 07:00  --------------------------------------------------------  IN:    lactated ringers.: 2100 mL  Total IN: 2100 mL    OUT:    Indwelling Catheter - Urethral: 2135 mL  Total OUT: 2135 mL    Total NET: -35 mL      13 Dec 2019 07:01  -  13 Dec 2019 09:55  --------------------------------------------------------  IN:  Total IN: 0 mL    OUT:    Indwelling Catheter - Urethral: 1300 mL  Total OUT: 1300 mL    Total NET: -1300 mL          LABS:                        10.8   10.47 )-----------( 226      ( 13 Dec 2019 05:58 )             35.2     12-13    138  |  104  |  6<L>  ----------------------------<  115<H>  4.3   |  23  |  0.72    Ca    9.0      13 Dec 2019 05:58  Phos  3.1     12-13  Mg     2.3     12-13            RADIOLOGY & ADDITIONAL STUDIES:

## 2019-12-13 NOTE — DIETITIAN INITIAL EVALUATION ADULT. - ENERGY NEEDS
Height: 66" Weight: 209.2lbs, IBW 130lbs+/-10%, %%, BMI 33.8 kg/m2  Above energy needs calculated for wt maintenance (20-25kcal/kg).   Weeks 1-2 estimated needs: 590-708kcal/day (10-12kcal/kg), 71-88g pro/day (1.2-1.5g/kg), >/=64oz clear fluids.

## 2019-12-14 LAB
ANION GAP SERPL CALC-SCNC: 8 MMOL/L — SIGNIFICANT CHANGE UP (ref 5–17)
BUN SERPL-MCNC: 7 MG/DL — SIGNIFICANT CHANGE UP (ref 7–23)
CALCIUM SERPL-MCNC: 8.6 MG/DL — SIGNIFICANT CHANGE UP (ref 8.4–10.5)
CHLORIDE SERPL-SCNC: 106 MMOL/L — SIGNIFICANT CHANGE UP (ref 96–108)
CO2 SERPL-SCNC: 24 MMOL/L — SIGNIFICANT CHANGE UP (ref 22–31)
CREAT SERPL-MCNC: 0.72 MG/DL — SIGNIFICANT CHANGE UP (ref 0.5–1.3)
GLUCOSE SERPL-MCNC: 85 MG/DL — SIGNIFICANT CHANGE UP (ref 70–99)
HCT VFR BLD CALC: 32.3 % — LOW (ref 34.5–45)
HGB BLD-MCNC: 9.9 G/DL — LOW (ref 11.5–15.5)
MAGNESIUM SERPL-MCNC: 2 MG/DL — SIGNIFICANT CHANGE UP (ref 1.6–2.6)
MCHC RBC-ENTMCNC: 27.5 PG — SIGNIFICANT CHANGE UP (ref 27–34)
MCHC RBC-ENTMCNC: 30.7 GM/DL — LOW (ref 32–36)
MCV RBC AUTO: 89.7 FL — SIGNIFICANT CHANGE UP (ref 80–100)
NRBC # BLD: 0 /100 WBCS — SIGNIFICANT CHANGE UP (ref 0–0)
PHOSPHATE SERPL-MCNC: 2.1 MG/DL — LOW (ref 2.5–4.5)
PLATELET # BLD AUTO: 206 K/UL — SIGNIFICANT CHANGE UP (ref 150–400)
POTASSIUM SERPL-MCNC: 4.3 MMOL/L — SIGNIFICANT CHANGE UP (ref 3.5–5.3)
POTASSIUM SERPL-SCNC: 4.3 MMOL/L — SIGNIFICANT CHANGE UP (ref 3.5–5.3)
RBC # BLD: 3.6 M/UL — LOW (ref 3.8–5.2)
RBC # FLD: 16.2 % — HIGH (ref 10.3–14.5)
SODIUM SERPL-SCNC: 138 MMOL/L — SIGNIFICANT CHANGE UP (ref 135–145)
WBC # BLD: 7.77 K/UL — SIGNIFICANT CHANGE UP (ref 3.8–10.5)
WBC # FLD AUTO: 7.77 K/UL — SIGNIFICANT CHANGE UP (ref 3.8–10.5)

## 2019-12-14 PROCEDURE — 93970 EXTREMITY STUDY: CPT | Mod: 26

## 2019-12-14 RX ORDER — OMEPRAZOLE 10 MG/1
1 CAPSULE, DELAYED RELEASE ORAL
Qty: 0 | Refills: 0 | DISCHARGE

## 2019-12-14 RX ORDER — ENOXAPARIN SODIUM 100 MG/ML
40 INJECTION SUBCUTANEOUS
Qty: 1120 | Refills: 0
Start: 2019-12-14 | End: 2020-01-10

## 2019-12-14 RX ORDER — PANTOPRAZOLE SODIUM 20 MG/1
1 TABLET, DELAYED RELEASE ORAL
Qty: 30 | Refills: 0
Start: 2019-12-14 | End: 2020-01-12

## 2019-12-14 RX ORDER — DOCUSATE SODIUM 100 MG
1 CAPSULE ORAL
Qty: 28 | Refills: 0
Start: 2019-12-14 | End: 2019-12-27

## 2019-12-14 RX ORDER — SODIUM,POTASSIUM PHOSPHATES 278-250MG
1 POWDER IN PACKET (EA) ORAL ONCE
Refills: 0 | Status: COMPLETED | OUTPATIENT
Start: 2019-12-14 | End: 2019-12-14

## 2019-12-14 RX ADMIN — Medication 30 MILLIGRAM(S): at 06:19

## 2019-12-14 RX ADMIN — Medication 30 MILLIGRAM(S): at 01:24

## 2019-12-14 RX ADMIN — SODIUM CHLORIDE 150 MILLILITER(S): 9 INJECTION, SOLUTION INTRAVENOUS at 07:51

## 2019-12-14 RX ADMIN — Medication 30 MILLIGRAM(S): at 18:19

## 2019-12-14 RX ADMIN — ENOXAPARIN SODIUM 40 MILLIGRAM(S): 100 INJECTION SUBCUTANEOUS at 17:47

## 2019-12-14 RX ADMIN — Medication 30 MILLIGRAM(S): at 07:25

## 2019-12-14 RX ADMIN — Medication 30 MILLIGRAM(S): at 00:58

## 2019-12-14 RX ADMIN — SCOPALAMINE 1 PATCH: 1 PATCH, EXTENDED RELEASE TRANSDERMAL at 06:21

## 2019-12-14 RX ADMIN — ENOXAPARIN SODIUM 40 MILLIGRAM(S): 100 INJECTION SUBCUTANEOUS at 06:19

## 2019-12-14 RX ADMIN — Medication 30 MILLIGRAM(S): at 12:34

## 2019-12-14 RX ADMIN — Medication 30 MILLIGRAM(S): at 16:05

## 2019-12-14 RX ADMIN — SCOPALAMINE 1 PATCH: 1 PATCH, EXTENDED RELEASE TRANSDERMAL at 19:20

## 2019-12-14 RX ADMIN — Medication 30 MILLIGRAM(S): at 17:47

## 2019-12-14 RX ADMIN — Medication 1 PACKET(S): at 17:47

## 2019-12-14 RX ADMIN — PANTOPRAZOLE SODIUM 40 MILLIGRAM(S): 20 TABLET, DELAYED RELEASE ORAL at 12:34

## 2019-12-14 NOTE — PROGRESS NOTE ADULT - ASSESSMENT
39 year old female with history of sleeve gastrectomy since developed GERD and dysphagia now presents for elective conversion of the  gastric bypass. Pt s/p robotic assisted gastric bypass     -Pain/nausea control  -BCLD/IVF  -Protonix  -Lovenox DVT ppx   -SCDs, OOB/A, IS  - Home after US to r/o DVT

## 2019-12-14 NOTE — PROGRESS NOTE ADULT - SUBJECTIVE AND OBJECTIVE BOX
SUBJECTIVE: Patient seen and examined bedside by chief resident. Tolerating diet, pain controlled.     enoxaparin Injectable 40 milliGRAM(s) SubCutaneous every 12 hours    MEDICATIONS  (PRN):  HYDROmorphone  Injectable 0.5 milliGRAM(s) IV Push every 4 hours PRN Breakthrough pain only  HYDROmorphone  Injectable 0.5 milliGRAM(s) IV Push every 4 hours PRN Severe Pain (7 - 10)  ondansetron Injectable 4 milliGRAM(s) IV Push every 6 hours PRN Nausea      I&O's Detail    13 Dec 2019 07:01  -  14 Dec 2019 07:00  --------------------------------------------------------  IN:    lactated ringers.: 1500 mL    Oral Fluid: 105 mL  Total IN: 1605 mL    OUT:    Indwelling Catheter - Urethral: 1300 mL    Voided: 1100 mL  Total OUT: 2400 mL    Total NET: -795 mL      14 Dec 2019 07:01  -  14 Dec 2019 15:46  --------------------------------------------------------  IN:  Total IN: 0 mL    OUT:    Voided: 600 mL  Total OUT: 600 mL    Total NET: -600 mL          Vital Signs Last 24 Hrs  T(C): 36.7 (14 Dec 2019 08:17), Max: 36.9 (13 Dec 2019 18:00)  T(F): 98 (14 Dec 2019 08:17), Max: 98.5 (13 Dec 2019 20:35)  HR: 74 (14 Dec 2019 13:00) (69 - 77)  BP: 129/77 (14 Dec 2019 13:00) (118/68 - 131/83)  BP(mean): --  RR: 18 (14 Dec 2019 13:00) (14 - 18)  SpO2: 97% (14 Dec 2019 13:00) (97% - 100%)    General: NAD, resting comfortably in bed  C/V: NSR  Pulm: Nonlabored breathing, no respiratory distress  Abd: obese, soft, NT, incision c/d/i  Extrem: WWP, no edema, SCDs in place    LABS:                        9.9    7.77  )-----------( 206      ( 14 Dec 2019 06:24 )             32.3     12-14    138  |  106  |  7   ----------------------------<  85  4.3   |  24  |  0.72    Ca    8.6      14 Dec 2019 06:24  Phos  2.1     12-14  Mg     2.0     12-14

## 2019-12-15 ENCOUNTER — TRANSCRIPTION ENCOUNTER (OUTPATIENT)
Age: 39
End: 2019-12-15

## 2019-12-15 VITALS
HEART RATE: 91 BPM | DIASTOLIC BLOOD PRESSURE: 78 MMHG | SYSTOLIC BLOOD PRESSURE: 122 MMHG | TEMPERATURE: 99 F | OXYGEN SATURATION: 99 % | RESPIRATION RATE: 18 BRPM

## 2019-12-15 RX ORDER — ENOXAPARIN SODIUM 100 MG/ML
40 INJECTION SUBCUTANEOUS
Qty: 2240 | Refills: 0
Start: 2019-12-15 | End: 2020-01-11

## 2019-12-15 RX ADMIN — Medication 30 MILLIGRAM(S): at 01:30

## 2019-12-15 RX ADMIN — SCOPALAMINE 1 PATCH: 1 PATCH, EXTENDED RELEASE TRANSDERMAL at 06:29

## 2019-12-15 RX ADMIN — Medication 30 MILLIGRAM(S): at 06:24

## 2019-12-15 RX ADMIN — Medication 30 MILLIGRAM(S): at 00:54

## 2019-12-15 RX ADMIN — ENOXAPARIN SODIUM 40 MILLIGRAM(S): 100 INJECTION SUBCUTANEOUS at 06:24

## 2019-12-15 RX ADMIN — PANTOPRAZOLE SODIUM 40 MILLIGRAM(S): 20 TABLET, DELAYED RELEASE ORAL at 11:25

## 2019-12-15 RX ADMIN — Medication 30 MILLIGRAM(S): at 07:20

## 2019-12-15 RX ADMIN — Medication 30 MILLIGRAM(S): at 11:25

## 2019-12-15 NOTE — DISCHARGE NOTE NURSING/CASE MANAGEMENT/SOCIAL WORK - PATIENT PORTAL LINK FT
You can access the FollowMyHealth Patient Portal offered by [facility] by registering at the following website: [email or web address]. By joining [employer]’s FollowMyHealth portal, you will also be able to view your health information using other applications (apps) compatible with our system.

## 2019-12-15 NOTE — PROGRESS NOTE ADULT - SUBJECTIVE AND OBJECTIVE BOX
INTERVAL HPI/OVERNIGHT EVENTS: KEISHA    STATUS POST:  Robotic assisted conversion of sleeve to gastric bypass     POST OPERATIVE DAY #: 3    SUBJECTIVE:  pt seen at bedside, feeling well. pain well controlled. denies nausea and vomiting. tolerating PO diet    MEDICATIONS  (STANDING):  enoxaparin Injectable 40 milliGRAM(s) SubCutaneous every 12 hours  ketorolac   Injectable 30 milliGRAM(s) IV Push every 6 hours  pantoprazole  Injectable 40 milliGRAM(s) IV Push daily    MEDICATIONS  (PRN):  HYDROmorphone  Injectable 0.5 milliGRAM(s) IV Push every 4 hours PRN Breakthrough pain only  HYDROmorphone  Injectable 0.5 milliGRAM(s) IV Push every 4 hours PRN Severe Pain (7 - 10)  ondansetron Injectable 4 milliGRAM(s) IV Push every 6 hours PRN Nausea      Vital Signs Last 24 Hrs  T(C): 36.4 (15 Dec 2019 04:37), Max: 37.1 (14 Dec 2019 18:00)  T(F): 97.6 (15 Dec 2019 04:37), Max: 98.7 (14 Dec 2019 18:00)  HR: 71 (15 Dec 2019 04:37) (68 - 75)  BP: 119/76 (15 Dec 2019 04:37) (117/73 - 129/77)  BP(mean): --  RR: 18 (15 Dec 2019 04:37) (17 - 18)  SpO2: 96% (15 Dec 2019 04:37) (96% - 99%)    PHYSICAL EXAM:      Constitutional: A&Ox3    Respiratory: non labored breathing, no respiratory distress    Cardiovascular: NSR, RRR    Gastrointestinal: soft, nondistended, mild incisional tenderness, incisions c/d/i    Extremities: (-) edema                  I&O's Detail    14 Dec 2019 07:01  -  15 Dec 2019 07:00  --------------------------------------------------------  IN:  Total IN: 0 mL    OUT:    Voided: 2100 mL  Total OUT: 2100 mL    Total NET: -2100 mL          LABS:                        9.9    7.77  )-----------( 206      ( 14 Dec 2019 06:24 )             32.3     12-14    138  |  106  |  7   ----------------------------<  85  4.3   |  24  |  0.72    Ca    8.6      14 Dec 2019 06:24  Phos  2.1     12-14  Mg     2.0     12-14            RADIOLOGY & ADDITIONAL STUDIES:

## 2019-12-15 NOTE — PROGRESS NOTE ADULT - ASSESSMENT
39 year old female with history of sleeve gastrectomy since developed GERD and dysphagia now presents for elective conversion of the  gastric bypass. Pt s/p robotic assisted gastric bypass     -Pain/nausea control  -BCLD/IVF  -Protonix  -Lovenox DVT ppx   -SCDs, OOB/A, IS  -AM labs  -plan for dispo to home today

## 2019-12-17 PROBLEM — K21.9 GASTRO-ESOPHAGEAL REFLUX DISEASE WITHOUT ESOPHAGITIS: Chronic | Status: ACTIVE | Noted: 2019-12-11

## 2019-12-17 PROBLEM — D64.9 ANEMIA, UNSPECIFIED: Chronic | Status: ACTIVE | Noted: 2019-12-11

## 2019-12-17 PROBLEM — I26.99 OTHER PULMONARY EMBOLISM WITHOUT ACUTE COR PULMONALE: Chronic | Status: ACTIVE | Noted: 2019-12-11

## 2019-12-17 PROBLEM — E55.9 VITAMIN D DEFICIENCY, UNSPECIFIED: Chronic | Status: ACTIVE | Noted: 2019-12-11

## 2019-12-17 PROBLEM — N64.9 DISORDER OF BREAST, UNSPECIFIED: Chronic | Status: ACTIVE | Noted: 2019-12-11

## 2019-12-17 PROBLEM — E66.01 MORBID (SEVERE) OBESITY DUE TO EXCESS CALORIES: Chronic | Status: ACTIVE | Noted: 2019-12-11

## 2019-12-17 PROBLEM — G43.909 MIGRAINE, UNSPECIFIED, NOT INTRACTABLE, WITHOUT STATUS MIGRAINOSUS: Chronic | Status: ACTIVE | Noted: 2019-12-11

## 2019-12-18 LAB — SURGICAL PATHOLOGY STUDY: SIGNIFICANT CHANGE UP

## 2019-12-20 PROCEDURE — 86850 RBC ANTIBODY SCREEN: CPT

## 2019-12-20 PROCEDURE — C1889: CPT

## 2019-12-20 PROCEDURE — 86900 BLOOD TYPING SEROLOGIC ABO: CPT

## 2019-12-20 PROCEDURE — 83735 ASSAY OF MAGNESIUM: CPT

## 2019-12-20 PROCEDURE — 85027 COMPLETE CBC AUTOMATED: CPT

## 2019-12-20 PROCEDURE — 84100 ASSAY OF PHOSPHORUS: CPT

## 2019-12-20 PROCEDURE — S2900: CPT

## 2019-12-20 PROCEDURE — 36415 COLL VENOUS BLD VENIPUNCTURE: CPT

## 2019-12-20 PROCEDURE — 93970 EXTREMITY STUDY: CPT

## 2019-12-20 PROCEDURE — 88307 TISSUE EXAM BY PATHOLOGIST: CPT

## 2019-12-20 PROCEDURE — 80048 BASIC METABOLIC PNL TOTAL CA: CPT

## 2019-12-20 PROCEDURE — 86901 BLOOD TYPING SEROLOGIC RH(D): CPT

## 2019-12-23 DIAGNOSIS — I27.20 PULMONARY HYPERTENSION, UNSPECIFIED: ICD-10-CM

## 2019-12-23 DIAGNOSIS — E66.01 MORBID (SEVERE) OBESITY DUE TO EXCESS CALORIES: ICD-10-CM

## 2019-12-23 DIAGNOSIS — G43.909 MIGRAINE, UNSPECIFIED, NOT INTRACTABLE, WITHOUT STATUS MIGRAINOSUS: ICD-10-CM

## 2019-12-23 DIAGNOSIS — R13.10 DYSPHAGIA, UNSPECIFIED: ICD-10-CM

## 2019-12-23 DIAGNOSIS — K21.9 GASTRO-ESOPHAGEAL REFLUX DISEASE WITHOUT ESOPHAGITIS: ICD-10-CM

## 2019-12-23 DIAGNOSIS — R10.9 UNSPECIFIED ABDOMINAL PAIN: ICD-10-CM

## 2019-12-23 DIAGNOSIS — K44.9 DIAPHRAGMATIC HERNIA WITHOUT OBSTRUCTION OR GANGRENE: ICD-10-CM

## 2019-12-24 ENCOUNTER — APPOINTMENT (OUTPATIENT)
Dept: SURGERY | Facility: CLINIC | Age: 39
End: 2019-12-24
Payer: MEDICAID

## 2019-12-24 VITALS
TEMPERATURE: 96.6 F | BODY MASS INDEX: 30.71 KG/M2 | HEART RATE: 103 BPM | WEIGHT: 198 LBS | DIASTOLIC BLOOD PRESSURE: 78 MMHG | SYSTOLIC BLOOD PRESSURE: 115 MMHG | HEIGHT: 67.5 IN | OXYGEN SATURATION: 98 %

## 2019-12-24 DIAGNOSIS — K21.9 GASTRO-ESOPHAGEAL REFLUX DISEASE W/OUT ESOPHAGITIS: ICD-10-CM

## 2019-12-24 PROCEDURE — 99024 POSTOP FOLLOW-UP VISIT: CPT

## 2019-12-24 NOTE — HISTORY OF PRESENT ILLNESS
[de-identified] : s/p  conversion of sleeve to Gastric Bypass\par  feeling well\par tolerating diet\par denies fever or chills

## 2020-01-14 ENCOUNTER — APPOINTMENT (OUTPATIENT)
Dept: SURGERY | Facility: CLINIC | Age: 40
End: 2020-01-14
Payer: MEDICAID

## 2020-01-14 VITALS
OXYGEN SATURATION: 98 % | BODY MASS INDEX: 28.85 KG/M2 | WEIGHT: 186 LBS | SYSTOLIC BLOOD PRESSURE: 111 MMHG | TEMPERATURE: 97.8 F | HEIGHT: 67.5 IN | HEART RATE: 93 BPM | DIASTOLIC BLOOD PRESSURE: 78 MMHG

## 2020-01-14 DIAGNOSIS — R13.10 DYSPHAGIA, UNSPECIFIED: ICD-10-CM

## 2020-01-14 PROCEDURE — 99024 POSTOP FOLLOW-UP VISIT: CPT

## 2020-01-14 RX ORDER — METOCLOPRAMIDE 5 MG/1
5 TABLET ORAL 3 TIMES DAILY
Qty: 30 | Refills: 1 | Status: ACTIVE | COMMUNITY
Start: 2020-01-14 | End: 1900-01-01

## 2020-01-14 NOTE — HISTORY OF PRESENT ILLNESS
[de-identified] : 40 y/o F presents today for post op s/p conversion of sleeve (surgery in 2012) to gastric bypass 12/12/19. Patient is doing well. She is tolerating liquids but complains of dysphagia to solids coupled with nausea and several episodes of vomiting. States that this began about two weeks ago. Otherwise, she denies fevers, chills.

## 2020-01-14 NOTE — ASSESSMENT
[FreeTextEntry1] : 40 y/o F presents today for post op s/p conversion of sleeve (surgery in 2012) to gastric bypass 12/12/19 here with c/o dysphagia, nausea, vomiting. I informed patient that this could be due to stenosis of the esophagus. Will send her for an endoscopy to further investigate this. I prescribed Reglan to help with nausea. Patient will follow up after endoscopy.

## 2020-01-16 ENCOUNTER — OUTPATIENT (OUTPATIENT)
Dept: OUTPATIENT SERVICES | Facility: HOSPITAL | Age: 40
LOS: 1 days | Discharge: HOME | End: 2020-01-16
Payer: MEDICAID

## 2020-01-16 ENCOUNTER — APPOINTMENT (OUTPATIENT)
Dept: SURGERY | Facility: HOSPITAL | Age: 40
End: 2020-01-16

## 2020-01-16 DIAGNOSIS — Z98.84 BARIATRIC SURGERY STATUS: Chronic | ICD-10-CM

## 2020-01-16 DIAGNOSIS — Z41.9 ENCOUNTER FOR PROCEDURE FOR PURPOSES OTHER THAN REMEDYING HEALTH STATE, UNSPECIFIED: Chronic | ICD-10-CM

## 2020-01-16 DIAGNOSIS — Z90.49 ACQUIRED ABSENCE OF OTHER SPECIFIED PARTS OF DIGESTIVE TRACT: Chronic | ICD-10-CM

## 2020-01-16 DIAGNOSIS — Z98.890 OTHER SPECIFIED POSTPROCEDURAL STATES: Chronic | ICD-10-CM

## 2020-01-16 PROCEDURE — 43245 EGD DILATE STRICTURE: CPT

## 2020-01-16 PROCEDURE — 43249 ESOPH EGD DILATION <30 MM: CPT | Mod: 78,GC

## 2020-01-16 RX ORDER — PANTOPRAZOLE SODIUM 20 MG/1
1 TABLET, DELAYED RELEASE ORAL
Qty: 30 | Refills: 1
Start: 2020-01-16 | End: 2020-03-15

## 2020-01-28 ENCOUNTER — APPOINTMENT (OUTPATIENT)
Dept: SURGERY | Facility: CLINIC | Age: 40
End: 2020-01-28
Payer: MEDICAID

## 2020-01-28 VITALS
TEMPERATURE: 97.4 F | OXYGEN SATURATION: 100 % | BODY MASS INDEX: 27.96 KG/M2 | WEIGHT: 180.25 LBS | HEIGHT: 67.5 IN | DIASTOLIC BLOOD PRESSURE: 78 MMHG | HEART RATE: 89 BPM | SYSTOLIC BLOOD PRESSURE: 114 MMHG

## 2020-01-28 PROCEDURE — 99024 POSTOP FOLLOW-UP VISIT: CPT

## 2020-01-28 RX ORDER — IRON PS COMPLEX/B12/FOLIC ACID 150-25-1
150-25-1 CAPSULE ORAL
Qty: 6 | Refills: 6 | Status: ACTIVE | COMMUNITY
Start: 2020-01-28 | End: 1900-01-01

## 2020-01-28 NOTE — HISTORY OF PRESENT ILLNESS
[de-identified] : Pt is a 39 y.o F 1 mo s/p conversion of sleeve to bypass who presents today feeling well. Pt is s/p egd on 1/16 due to intolerance to drinking. States she has lost 30 lbs since surgery, walking daily for exercise. States she is taking her vitamins daily, will send iron to pt. States she is using the bathroom once a week, pt to advance to full diet and start fiber supplement. Pt has been on liquids since her egd and tolerating, will advance diet. States she has no reflux.

## 2020-03-31 ENCOUNTER — APPOINTMENT (OUTPATIENT)
Dept: SURGERY | Facility: CLINIC | Age: 40
End: 2020-03-31
Payer: MEDICAID

## 2020-03-31 VITALS — HEIGHT: 67.5 IN | WEIGHT: 168 LBS | BODY MASS INDEX: 26.06 KG/M2

## 2020-03-31 PROCEDURE — 99442: CPT

## 2020-03-31 NOTE — HISTORY OF PRESENT ILLNESS
[de-identified] : Pt is a 38 y/o F 3 mo s/p conversion of VSG to RYGB, who agreed to proceed with a phone consultation amid ephraim. States she is feeling well at home and staying safe, reports weighing 168. States she is happy with her current weight and does not want to lose anymore. Discussed how to work on maintaining her weight, focusing on high protein diet and lots of strength training exercises. Will review with Nadeen. Pt states she is compliant with her home vitamins, pt is taking OTC iron 65 mg. States she is doing well with her diet, discussed avoiding red meats. States she is snacking throughout the day. Reports having 1 bottle of water daily and tea, advised pt the importance of hydration and to increase fluids to 4 bottles daily. Pt taking 1.5 protein shakes daily, states she is doing some home exercises as her gym is currently closed. Denies n,v,c,d,reflux. No other concerns at this time. \par \par

## 2020-08-14 ENCOUNTER — APPOINTMENT (OUTPATIENT)
Dept: SURGERY | Facility: CLINIC | Age: 40
End: 2020-08-14
Payer: MEDICAID

## 2020-08-14 VITALS
HEART RATE: 87 BPM | HEIGHT: 67.5 IN | TEMPERATURE: 97.7 F | WEIGHT: 174 LBS | OXYGEN SATURATION: 100 % | BODY MASS INDEX: 26.99 KG/M2 | SYSTOLIC BLOOD PRESSURE: 130 MMHG | DIASTOLIC BLOOD PRESSURE: 88 MMHG

## 2020-08-14 DIAGNOSIS — Z00.00 ENCOUNTER FOR GENERAL ADULT MEDICAL EXAMINATION W/OUT ABNORMAL FINDINGS: ICD-10-CM

## 2020-08-14 PROCEDURE — 99213 OFFICE O/P EST LOW 20 MIN: CPT

## 2020-08-14 NOTE — PLAN
[FreeTextEntry1] : Nutrition\par Complete abx tx\par Dairy free diet\par Probiotic/benefiber\par Reevaluate in 1 mo, labs

## 2020-08-14 NOTE — HISTORY OF PRESENT ILLNESS
[de-identified] : Pt is a 40 y/o F 8 mo s/p conversion of vsg to rygb. Pt reports a 40 lb wt loss since surgery and she is very happy with her wt loss progress. Reports that about 2 weeks ago she presented to [facility] ED for diarrhea and was told she had a viral infection. Pt was given cipro 500 x 10 tabs which she is still taking. Reports improvement in her symptoms and bm have decreased from 5-6 times daily to 3-4. After review of diet pt reports symptoms with dairy such as cheese, milk, protein shakes. Pt may have developed a lactose intolerance. Reviewed dairy free diet and incorporating benefiber and probiotic. Pt will meet with nutrition today to review diet. Pt is compliant with vitamins at home. Reports walking for exercise. Denies n,v, pain, reflux.

## 2020-09-29 ENCOUNTER — APPOINTMENT (OUTPATIENT)
Dept: SURGERY | Facility: CLINIC | Age: 40
End: 2020-09-29

## 2020-10-05 LAB
25(OH)D3 SERPL-MCNC: 15.2 NG/ML
A-TOCOPHEROL VIT E SERPL-MCNC: 11.8 MG/L
ALBUMIN SERPL ELPH-MCNC: 4.1 G/DL
ALP BLD-CCNC: 78 U/L
ALT SERPL-CCNC: 22 U/L
ANION GAP SERPL CALC-SCNC: 17 MMOL/L
AST SERPL-CCNC: 19 U/L
BASOPHILS # BLD AUTO: 0.04 K/UL
BASOPHILS NFR BLD AUTO: 0.5 %
BETA+GAMMA TOCOPHEROL SERPL-MCNC: 0.9 MG/L
BILIRUB SERPL-MCNC: 0.2 MG/DL
BUN SERPL-MCNC: 7 MG/DL
CA-I SERPL-SCNC: 1.31 MMOL/L
CALCIUM SERPL-MCNC: 9.3 MG/DL
CALCIUM SERPL-MCNC: 9.3 MG/DL
CHLORIDE SERPL-SCNC: 110 MMOL/L
CHOLEST SERPL-MCNC: 183 MG/DL
CHOLEST/HDLC SERPL: 1.9 RATIO
CO2 SERPL-SCNC: 18 MMOL/L
CREAT SERPL-MCNC: 0.69 MG/DL
EOSINOPHIL # BLD AUTO: 0.11 K/UL
EOSINOPHIL NFR BLD AUTO: 1.5 %
ESTIMATED AVERAGE GLUCOSE: 105 MG/DL
FOLATE SERPL-MCNC: 8.6 NG/ML
GLUCOSE SERPL-MCNC: 92 MG/DL
HBA1C MFR BLD HPLC: 5.3 %
HCT VFR BLD CALC: 32.9 %
HDLC SERPL-MCNC: 94 MG/DL
HGB BLD-MCNC: 10.1 G/DL
IMM GRANULOCYTES NFR BLD AUTO: 0.1 %
INR PPP: 1.07 RATIO
IRON SATN MFR SERPL: 10 %
IRON SERPL-MCNC: 38 UG/DL
LDLC SERPL CALC-MCNC: 73 MG/DL
LYMPHOCYTES # BLD AUTO: 1.56 K/UL
LYMPHOCYTES NFR BLD AUTO: 20.9 %
MAN DIFF?: NORMAL
MCHC RBC-ENTMCNC: 28.5 PG
MCHC RBC-ENTMCNC: 30.7 GM/DL
MCV RBC AUTO: 92.7 FL
MONOCYTES # BLD AUTO: 0.62 K/UL
MONOCYTES NFR BLD AUTO: 8.3 %
NEUTROPHILS # BLD AUTO: 5.14 K/UL
NEUTROPHILS NFR BLD AUTO: 68.7 %
PARATHYROID HORMONE INTACT: 97 PG/ML
PLATELET # BLD AUTO: 261 K/UL
POTASSIUM SERPL-SCNC: 5.1 MMOL/L
PREALB SERPL NEPH-MCNC: 21 MG/DL
PROT SERPL-MCNC: 6.7 G/DL
PT BLD: 12.6 SEC
RBC # BLD: 3.55 M/UL
RBC # FLD: 16.7 %
SODIUM SERPL-SCNC: 145 MMOL/L
TIBC SERPL-MCNC: 388 UG/DL
TRIGL SERPL-MCNC: 77 MG/DL
TSH SERPL-ACNC: 1.39 UIU/ML
UIBC SERPL-MCNC: 351 UG/DL
VIT A SERPL-MCNC: 35.8 UG/DL
VIT B1 SERPL-MCNC: 89.9 NMOL/L
VIT B12 SERPL-MCNC: 549 PG/ML
WBC # FLD AUTO: 7.48 K/UL
ZINC SERPL-MCNC: 76 UG/DL

## 2021-06-04 ENCOUNTER — APPOINTMENT (OUTPATIENT)
Dept: SURGERY | Facility: CLINIC | Age: 41
End: 2021-06-04

## 2021-06-11 ENCOUNTER — APPOINTMENT (OUTPATIENT)
Dept: SURGERY | Facility: CLINIC | Age: 41
End: 2021-06-11
Payer: MEDICAID

## 2021-06-11 VITALS
DIASTOLIC BLOOD PRESSURE: 81 MMHG | BODY MASS INDEX: 26.68 KG/M2 | WEIGHT: 172 LBS | SYSTOLIC BLOOD PRESSURE: 137 MMHG | TEMPERATURE: 97.1 F | HEART RATE: 77 BPM | OXYGEN SATURATION: 96 % | HEIGHT: 67.5 IN

## 2021-06-11 DIAGNOSIS — R19.7 DIARRHEA, UNSPECIFIED: ICD-10-CM

## 2021-06-11 DIAGNOSIS — R10.9 UNSPECIFIED ABDOMINAL PAIN: ICD-10-CM

## 2021-06-11 DIAGNOSIS — E66.01 MORBID (SEVERE) OBESITY DUE TO EXCESS CALORIES: ICD-10-CM

## 2021-06-11 PROCEDURE — 99213 OFFICE O/P EST LOW 20 MIN: CPT

## 2021-06-11 NOTE — HISTORY OF PRESENT ILLNESS
[de-identified] : Pt is a 38 y/o F s/p conversion of vsg to rygb 1/16/2020 presenting for bariatric follow-up. She reports 4-5 episodes diarrhea qd and states that she recently presented to the ER for these gastric complaints. She reports upper abdominal pain and hiatal hernia sensation s/p eating. She has been taking NSAIDS to resolve this pain. She is happy with weight loss.

## 2021-06-11 NOTE — PLAN
[FreeTextEntry1] : See dietician \par Sent for sonogram and abdominal CT scan \par f/u four weeks \par

## 2021-06-12 LAB
25(OH)D3 SERPL-MCNC: 14.5 NG/ML
ALBUMIN SERPL ELPH-MCNC: 4.1 G/DL
ALP BLD-CCNC: 96 U/L
ALT SERPL-CCNC: 27 U/L
ANION GAP SERPL CALC-SCNC: 10 MMOL/L
AST SERPL-CCNC: 23 U/L
BASOPHILS # BLD AUTO: 0.03 K/UL
BASOPHILS NFR BLD AUTO: 0.4 %
BILIRUB SERPL-MCNC: 0.2 MG/DL
BUN SERPL-MCNC: 12 MG/DL
CA-I SERPL-SCNC: 1.3 MMOL/L
CALCIUM SERPL-MCNC: 9.4 MG/DL
CALCIUM SERPL-MCNC: 9.4 MG/DL
CHLORIDE SERPL-SCNC: 106 MMOL/L
CHOLEST SERPL-MCNC: 187 MG/DL
CO2 SERPL-SCNC: 24 MMOL/L
CREAT SERPL-MCNC: 0.61 MG/DL
EOSINOPHIL # BLD AUTO: 0.13 K/UL
EOSINOPHIL NFR BLD AUTO: 1.7 %
FOLATE SERPL-MCNC: 5.2 NG/ML
GLUCOSE SERPL-MCNC: 92 MG/DL
HCT VFR BLD CALC: 38.2 %
HDLC SERPL-MCNC: 97 MG/DL
HGB BLD-MCNC: 11.6 G/DL
IMM GRANULOCYTES NFR BLD AUTO: 0.1 %
INR PPP: 1.05 RATIO
IRON SATN MFR SERPL: 5 %
IRON SERPL-MCNC: 20 UG/DL
LDLC SERPL CALC-MCNC: 70 MG/DL
LYMPHOCYTES # BLD AUTO: 1.76 K/UL
LYMPHOCYTES NFR BLD AUTO: 23.6 %
MAN DIFF?: NORMAL
MCHC RBC-ENTMCNC: 30.4 GM/DL
MCHC RBC-ENTMCNC: 30.9 PG
MCV RBC AUTO: 101.9 FL
MONOCYTES # BLD AUTO: 0.51 K/UL
MONOCYTES NFR BLD AUTO: 6.8 %
NEUTROPHILS # BLD AUTO: 5.01 K/UL
NEUTROPHILS NFR BLD AUTO: 67.4 %
NONHDLC SERPL-MCNC: 91 MG/DL
PARATHYROID HORMONE INTACT: 143 PG/ML
PLATELET # BLD AUTO: 256 K/UL
POTASSIUM SERPL-SCNC: 5.3 MMOL/L
PREALB SERPL NEPH-MCNC: 23 MG/DL
PROT SERPL-MCNC: 6.7 G/DL
PT BLD: 12.4 SEC
RBC # BLD: 3.75 M/UL
RBC # FLD: 14 %
SODIUM SERPL-SCNC: 140 MMOL/L
TIBC SERPL-MCNC: 399 UG/DL
TRIGL SERPL-MCNC: 103 MG/DL
TSH SERPL-ACNC: 1.68 UIU/ML
UIBC SERPL-MCNC: 379 UG/DL
VIT B12 SERPL-MCNC: 557 PG/ML
WBC # FLD AUTO: 7.45 K/UL

## 2021-06-15 LAB
A-TOCOPHEROL VIT E SERPL-MCNC: 11.1 MG/L
BETA+GAMMA TOCOPHEROL SERPL-MCNC: 0.9 MG/L
VIT A SERPL-MCNC: 43.8 UG/DL
VIT B1 SERPL-MCNC: 113.7 NMOL/L

## 2021-06-16 LAB — ZINC SERPL-MCNC: 71 UG/DL

## 2022-04-06 ENCOUNTER — APPOINTMENT (OUTPATIENT)
Dept: BARIATRICS | Facility: CLINIC | Age: 42
End: 2022-04-06
Payer: MEDICAID

## 2022-04-06 VITALS
SYSTOLIC BLOOD PRESSURE: 133 MMHG | HEIGHT: 67.5 IN | WEIGHT: 170.13 LBS | BODY MASS INDEX: 26.39 KG/M2 | TEMPERATURE: 96.8 F | HEART RATE: 85 BPM | DIASTOLIC BLOOD PRESSURE: 53 MMHG

## 2022-04-06 DIAGNOSIS — Z98.84 BARIATRIC SURGERY STATUS: ICD-10-CM

## 2022-04-06 PROCEDURE — 99213 OFFICE O/P EST LOW 20 MIN: CPT

## 2022-04-07 NOTE — HISTORY OF PRESENT ILLNESS
[de-identified] : Ms. Luis Fernando Badillo presents today for a follow up visit with complains of nausea and abdominal discomfort since 3 months s/p ventral and inguinal hernia repair on 1/5/22 in [facility]. Occasionally will be accompanied by vomiting. Denies severe pain, fevers, inability to pass gas or constipation. Reports symptoms with liquids and solids. She followed up with her general surgeon and reports operation went without complications and was advised to follow up with her bariatric team.

## 2022-04-07 NOTE — ASSESSMENT
[FreeTextEntry1] : 41 year old female with history conversion of sleeve gastrectomy to gastric bypass in 2020 presenting for a follow up with complains of nausea and abdominal discomfort since ventral and inguinal hernia repair on 1/5/22 in another hospital. Patient notes increased symptoms including bloating and diarrhea with dairy products. Advised to avoid dairy products and to adhere to soft foods she is able to tolerate. Will order blood work and upper GI series and follow up with results.

## 2022-04-19 ENCOUNTER — NON-APPOINTMENT (OUTPATIENT)
Age: 42
End: 2022-04-19

## 2022-04-19 DIAGNOSIS — E55.9 VITAMIN D DEFICIENCY, UNSPECIFIED: ICD-10-CM

## 2022-04-19 LAB
25(OH)D3 SERPL-MCNC: 9.6 NG/ML
ALBUMIN SERPL ELPH-MCNC: 4.2 G/DL
ALP BLD-CCNC: 88 U/L
ALT SERPL-CCNC: 40 U/L
ANION GAP SERPL CALC-SCNC: 12 MMOL/L
AST SERPL-CCNC: 24 U/L
BASOPHILS # BLD AUTO: 0.01 K/UL
BASOPHILS NFR BLD AUTO: 0.2 %
BILIRUB SERPL-MCNC: 0.3 MG/DL
BUN SERPL-MCNC: 9 MG/DL
CALCIUM SERPL-MCNC: 9.3 MG/DL
CALCIUM SERPL-MCNC: 9.3 MG/DL
CHLORIDE SERPL-SCNC: 105 MMOL/L
CHOLEST SERPL-MCNC: 214 MG/DL
CO2 SERPL-SCNC: 22 MMOL/L
CREAT SERPL-MCNC: 0.6 MG/DL
EGFR: 115 ML/MIN/1.73M2
EOSINOPHIL # BLD AUTO: 0.09 K/UL
EOSINOPHIL NFR BLD AUTO: 1.5 %
ESTIMATED AVERAGE GLUCOSE: 103 MG/DL
FOLATE SERPL-MCNC: 5.1 NG/ML
GLUCOSE SERPL-MCNC: 87 MG/DL
HBA1C MFR BLD HPLC: 5.2 %
HCT VFR BLD CALC: 39.1 %
HDLC SERPL-MCNC: 96 MG/DL
HGB BLD-MCNC: 12 G/DL
IMM GRANULOCYTES NFR BLD AUTO: 0.2 %
IRON SERPL-MCNC: 56 UG/DL
LDLC SERPL CALC-MCNC: 97 MG/DL
LYMPHOCYTES # BLD AUTO: 1.55 K/UL
LYMPHOCYTES NFR BLD AUTO: 26.4 %
MAN DIFF?: NORMAL
MCHC RBC-ENTMCNC: 30.4 PG
MCHC RBC-ENTMCNC: 30.7 GM/DL
MCV RBC AUTO: 99 FL
MONOCYTES # BLD AUTO: 0.49 K/UL
MONOCYTES NFR BLD AUTO: 8.3 %
NEUTROPHILS # BLD AUTO: 3.72 K/UL
NEUTROPHILS NFR BLD AUTO: 63.4 %
NONHDLC SERPL-MCNC: 118 MG/DL
PARATHYROID HORMONE INTACT: 170 PG/ML
PLATELET # BLD AUTO: 262 K/UL
POTASSIUM SERPL-SCNC: 4.6 MMOL/L
PREALB SERPL NEPH-MCNC: 24 MG/DL
PROT SERPL-MCNC: 7.2 G/DL
RBC # BLD: 3.95 M/UL
RBC # FLD: 14.7 %
SODIUM SERPL-SCNC: 140 MMOL/L
TRIGL SERPL-MCNC: 103 MG/DL
TSH SERPL-ACNC: 1.59 UIU/ML
VIT B1 SERPL-MCNC: 113.8 NMOL/L
VIT B12 SERPL-MCNC: 935 PG/ML
WBC # FLD AUTO: 5.87 K/UL
ZINC SERPL-MCNC: 72 UG/DL

## 2022-04-19 RX ORDER — ERGOCALCIFEROL 1.25 MG/1
1.25 MG CAPSULE, LIQUID FILLED ORAL
Qty: 12 | Refills: 0 | Status: ACTIVE | COMMUNITY
Start: 2022-04-19 | End: 1900-01-01

## 2022-05-05 LAB
A-TOCOPHEROL VIT E SERPL-MCNC: 11.3 MG/L
BETA+GAMMA TOCOPHEROL SERPL-MCNC: 0.7 MG/L
VIT A SERPL-MCNC: 36.1 UG/DL

## 2022-05-12 ENCOUNTER — OUTPATIENT (OUTPATIENT)
Dept: OUTPATIENT SERVICES | Facility: HOSPITAL | Age: 42
LOS: 1 days | End: 2022-05-12
Payer: MEDICAID

## 2022-05-12 ENCOUNTER — APPOINTMENT (OUTPATIENT)
Dept: RADIOLOGY | Facility: HOSPITAL | Age: 42
End: 2022-05-12
Payer: MEDICAID

## 2022-05-12 DIAGNOSIS — Z90.49 ACQUIRED ABSENCE OF OTHER SPECIFIED PARTS OF DIGESTIVE TRACT: Chronic | ICD-10-CM

## 2022-05-12 DIAGNOSIS — Z98.890 OTHER SPECIFIED POSTPROCEDURAL STATES: Chronic | ICD-10-CM

## 2022-05-12 DIAGNOSIS — Z41.9 ENCOUNTER FOR PROCEDURE FOR PURPOSES OTHER THAN REMEDYING HEALTH STATE, UNSPECIFIED: Chronic | ICD-10-CM

## 2022-05-12 DIAGNOSIS — Z98.84 BARIATRIC SURGERY STATUS: Chronic | ICD-10-CM

## 2022-05-12 PROCEDURE — 74240 X-RAY XM UPR GI TRC 1CNTRST: CPT | Mod: 26

## 2022-05-12 PROCEDURE — 74240 X-RAY XM UPR GI TRC 1CNTRST: CPT

## 2022-05-31 ENCOUNTER — NON-APPOINTMENT (OUTPATIENT)
Age: 42
End: 2022-05-31

## 2023-01-24 NOTE — PRE-OP CHECKLIST - WAS PATIENT ON BETA BLOCKER?
January 24, 2023     Patient: Jada Schultz   YOB: 2008       To Whom it May Concern:    Jada Schultz has received treatment at this office on the following dates: 1/24/2023. They may resume school on 1/24 or 1/25 and have limited participation in physicial education. She may participate as tolerated, avoiding right arm    If you have any questions or concerns, please don't hesitate to call.      Sincerely,           Brea Fairbanks MD      Medical information is confidential and cannot be disclosed without the written consent of the patient or her representative.    CC: No Recipients     No